# Patient Record
Sex: MALE | Race: WHITE | NOT HISPANIC OR LATINO | URBAN - METROPOLITAN AREA
[De-identification: names, ages, dates, MRNs, and addresses within clinical notes are randomized per-mention and may not be internally consistent; named-entity substitution may affect disease eponyms.]

---

## 2017-06-13 ENCOUNTER — OUTPATIENT (OUTPATIENT)
Dept: OUTPATIENT SERVICES | Facility: HOSPITAL | Age: 82
LOS: 1 days | End: 2017-06-13

## 2018-02-06 ENCOUNTER — OUTPATIENT (OUTPATIENT)
Dept: OUTPATIENT SERVICES | Facility: HOSPITAL | Age: 83
LOS: 1 days | End: 2018-02-06

## 2021-10-18 ENCOUNTER — APPOINTMENT (OUTPATIENT)
Dept: OPHTHALMOLOGY | Facility: CLINIC | Age: 86
End: 2021-10-18
Payer: MEDICARE

## 2021-10-18 ENCOUNTER — NON-APPOINTMENT (OUTPATIENT)
Age: 86
End: 2021-10-18

## 2021-10-18 PROCEDURE — 92014 COMPRE OPH EXAM EST PT 1/>: CPT

## 2022-02-04 PROBLEM — Z00.00 ENCOUNTER FOR PREVENTIVE HEALTH EXAMINATION: Status: ACTIVE | Noted: 2022-02-04

## 2022-02-07 ENCOUNTER — APPOINTMENT (OUTPATIENT)
Dept: NEUROSURGERY | Facility: CLINIC | Age: 87
End: 2022-02-07
Payer: MEDICARE

## 2022-02-07 VITALS
SYSTOLIC BLOOD PRESSURE: 140 MMHG | BODY MASS INDEX: 23.03 KG/M2 | HEIGHT: 72 IN | HEART RATE: 75 BPM | DIASTOLIC BLOOD PRESSURE: 80 MMHG | WEIGHT: 170 LBS

## 2022-02-07 DIAGNOSIS — M21.372 FOOT DROP, LEFT FOOT: ICD-10-CM

## 2022-02-07 DIAGNOSIS — M48.061 SPINAL STENOSIS, LUMBAR REGION WITHOUT NEUROGENIC CLAUDICATION: ICD-10-CM

## 2022-02-07 DIAGNOSIS — M21.371 FOOT DROP, RIGHT FOOT: ICD-10-CM

## 2022-02-07 PROCEDURE — 99204 OFFICE O/P NEW MOD 45 MIN: CPT

## 2022-02-07 RX ORDER — TIOTROPIUM BROMIDE AND OLODATEROL 3.124; 2.736 UG/1; UG/1
2.5-2.5 SPRAY, METERED RESPIRATORY (INHALATION)
Qty: 4 | Refills: 0 | Status: ACTIVE | COMMUNITY
Start: 2022-01-22

## 2022-02-07 RX ORDER — OMEPRAZOLE 20 MG/1
20 CAPSULE, DELAYED RELEASE ORAL
Qty: 90 | Refills: 0 | Status: ACTIVE | COMMUNITY
Start: 2022-02-04

## 2022-02-07 RX ORDER — TAMSULOSIN HYDROCHLORIDE 0.4 MG/1
0.4 CAPSULE ORAL
Qty: 90 | Refills: 0 | Status: ACTIVE | COMMUNITY
Start: 2021-05-24

## 2022-02-07 RX ORDER — ERYTHROMYCIN 5 MG/G
5 OINTMENT OPHTHALMIC
Qty: 4 | Refills: 0 | Status: ACTIVE | COMMUNITY
Start: 2021-10-18

## 2022-02-07 RX ORDER — METOPROLOL TARTRATE 25 MG/1
25 TABLET, FILM COATED ORAL
Qty: 180 | Refills: 0 | Status: ACTIVE | COMMUNITY
Start: 2021-11-22

## 2022-02-07 RX ORDER — FINASTERIDE 5 MG/1
5 TABLET, FILM COATED ORAL
Qty: 90 | Refills: 0 | Status: ACTIVE | COMMUNITY
Start: 2021-06-11

## 2022-02-07 RX ORDER — ERGOCALCIFEROL 1.25 MG/1
1.25 MG CAPSULE, LIQUID FILLED ORAL
Qty: 12 | Refills: 0 | Status: ACTIVE | COMMUNITY
Start: 2022-02-01

## 2022-02-07 NOTE — REASON FOR VISIT
[New Patient Visit] : a new patient visit [Referred By: _________] : Patient was referred by JULIO [Spouse] : spouse [FreeTextEntry1] : Stenosis/Foot drop

## 2022-02-07 NOTE — RESULTS/DATA
[FreeTextEntry1] : \[facility] MRI imaging disc was reviewed in the DICOM viewer.  It shows extremely severe stenosis at L3-4 and L4-5 with a disc bulge paracentral to the left at L4-5.  There is no evidence of spondylolisthesis.  There is severe degenerative disc disease from L2-L4 5.

## 2022-02-07 NOTE — PLAN
[FreeTextEntry1] : DIAGNOSIS:  LUMBAR STENOSIS \par TREATMENT:  NONSURGICAL VS LUMBAR DECOMPRESSION WITHOUT FUSION\par \par This is a patient with lumbar spondylosis and stenosis.  At this point he is pending EMG nerve conduction studies.  I think this will be helpful because a lot of his clinical scenario is consistent with a peripheral neuropathy.  Certainly given the fact that there is severe stenosis on imaging does not suggest that peripheral neuropathy cannot be the etiology.  If it is more of a peripheral problem and less of the lumbar region that I would not recommend surgery however if there is really no evidence of peripheral neuropathy and this is all due to the lumbar stenosis which is clearly evident on MRI then I have recommended lumbar decompression as a treatment option.  This entails removing the lamina and the medial facet joints along with the underlying hypertrophied ligamentum flavum.  This will allow for a widening of the spinal canal and the neuroforamen at the effected levels.  In doing so this will possibly result in added instability to the spine at this level requiring the need for instrumented stabilization and fusion.  This implies the use of pedicle screws and possibly interbody prosthetics to provide strength and anatomical alignment to the operated segments.  At this point, we will endeavor to avoid fusion but have suggested that it may be needed now or in the future.  \par \par Risks and benefits of surgery were described to the patient in detail which include but not excluding those otherwise not mentioned:  coma paralysis, death, stroke, spinal fluid leak, nerve injury, weakness, infection, hardware malfunction/failure/mal-positioning requiring re-operation, vascular injury, nonunion/pseudoarthrosis, adjacent segment degeneration and persistent pain.   \par

## 2022-02-07 NOTE — PHYSICAL EXAM
[Antalgic] : antalgic [Pain / Temp Decrease Lateral Leg & Dorsum Of Foot Right Only] : L5 sensory impairment [Pain / Temp Decrease Sole Of Foot & Posterior Leg Left Only] : S1 sensory impairment [Pain / Temp Decrease Lateral Leg & Dorsum Of Foot Left Only] : L5 sensory impairment [Pain / Temp Decrease Sole Of Foot & Posterior Leg Bilateral] : S1 sensory impairment [4] : 4/5 Knee Extensor (L3) [1] : 1/5 Ankle Plantar Flexion (S1) [0] : 0/4 Ankle Jerk Reflex [Able to toe walk] : the patient was not able to toe walk [Able to heel walk] : the patient was not able to heel walk

## 2022-02-07 NOTE — HISTORY OF PRESENT ILLNESS
[de-identified] : \katalina Voss is a pleasant 87-year-old who is here for evaluation of his lumbar spine.  He is otherwise very healthy except for the fact that he is developed bilateral foot drop over the course of the past months to about a year.  The overall development is unclear but he states has had some back problems on and off treated with chiropractic and simple things over the years however the weakness in the feet and the difficulty walking is is within the year.  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  He is otherwise pretty active and has very few health issues other than his spine currently.  He has no symptoms of cauda equina compression.  He has no bowel or bladder incontinence.  He has some stocking distribution numbness without overt radicular complaints.  He does not really have neurogenic claudication but certainly is having difficulty walking because of weakness in dorsi and plantar flexion.\par \par \par MADINA Radford\par neuro  Duyen/Shaun

## 2022-04-13 ENCOUNTER — APPOINTMENT (OUTPATIENT)
Dept: OPHTHALMOLOGY | Facility: CLINIC | Age: 87
End: 2022-04-13

## 2022-04-25 ENCOUNTER — APPOINTMENT (OUTPATIENT)
Dept: OPHTHALMOLOGY | Facility: CLINIC | Age: 87
End: 2022-04-25